# Patient Record
Sex: FEMALE | Race: WHITE | NOT HISPANIC OR LATINO | URBAN - NONMETROPOLITAN AREA
[De-identification: names, ages, dates, MRNs, and addresses within clinical notes are randomized per-mention and may not be internally consistent; named-entity substitution may affect disease eponyms.]

---

## 2021-08-25 ENCOUNTER — IMPORTED ENCOUNTER (OUTPATIENT)
Dept: URBAN - NONMETROPOLITAN AREA CLINIC 1 | Facility: CLINIC | Age: 76
End: 2021-08-25

## 2021-08-25 PROBLEM — H52.4: Noted: 2021-08-25

## 2021-08-25 PROBLEM — Z96.1: Noted: 2021-08-25

## 2021-08-25 PROBLEM — H26.493: Noted: 2021-08-25

## 2021-08-25 PROCEDURE — 99203 OFFICE O/P NEW LOW 30 MIN: CPT

## 2021-08-25 PROCEDURE — 92015 DETERMINE REFRACTIVE STATE: CPT

## 2021-08-25 NOTE — PATIENT DISCUSSION
Pseudophakia OU with PCO OU- Discussed diagnosis in detail with patient- Cataract surgery done by Dr. Anne-Marie Oliva at [facility] 2015- Patient has noticed decreased vision and trouble with glare - PCO OU noted but stable and no treatment needed at this time - Continue to monitorAstigmatism / Hyperopia / Presbyopia OU - Discussed diagnosis in detail with patient- New Glasses RX given today- Continue to monitor

## 2022-03-21 ENCOUNTER — EMERGENCY VISIT (OUTPATIENT)
Dept: URBAN - NONMETROPOLITAN AREA CLINIC 1 | Facility: CLINIC | Age: 77
End: 2022-03-21

## 2022-03-21 DIAGNOSIS — H43.811: ICD-10-CM

## 2022-03-21 PROCEDURE — 99213 OFFICE O/P EST LOW 20 MIN: CPT

## 2022-03-21 PROCEDURE — 92250 FUNDUS PHOTOGRAPHY W/I&R: CPT

## 2022-03-21 ASSESSMENT — TONOMETRY
OS_IOP_MMHG: 17
OD_IOP_MMHG: 16

## 2022-03-21 ASSESSMENT — VISUAL ACUITY
OS_CC: 20/25-2
OD_CC: 20/20

## 2022-04-10 ASSESSMENT — VISUAL ACUITY
OS_SC: 20/40-
OD_GLARE: 20/70
OS_PH: 20/30
OS_GLARE: 20/100
OD_SC: 20/20

## 2022-04-10 ASSESSMENT — TONOMETRY
OS_IOP_MMHG: 12
OD_IOP_MMHG: 12

## 2023-10-02 ENCOUNTER — COMPREHENSIVE EXAM (OUTPATIENT)
Dept: URBAN - NONMETROPOLITAN AREA CLINIC 1 | Facility: CLINIC | Age: 78
End: 2023-10-02

## 2023-10-02 DIAGNOSIS — H52.4: ICD-10-CM

## 2023-10-02 PROCEDURE — 92014 COMPRE OPH EXAM EST PT 1/>: CPT

## 2023-10-02 PROCEDURE — 92015 DETERMINE REFRACTIVE STATE: CPT

## 2023-10-02 ASSESSMENT — TONOMETRY
OS_IOP_MMHG: 16
OD_IOP_MMHG: 16

## 2023-10-02 ASSESSMENT — VISUAL ACUITY
OS_CC: 20/30+1
OU_CC: 20/25
OD_CC: 20/25

## 2024-10-03 ENCOUNTER — COMPREHENSIVE EXAM (OUTPATIENT)
Dept: URBAN - NONMETROPOLITAN AREA CLINIC 1 | Facility: CLINIC | Age: 79
End: 2024-10-03

## 2024-10-03 DIAGNOSIS — H52.4: ICD-10-CM

## 2024-10-03 PROCEDURE — 92015 DETERMINE REFRACTIVE STATE: CPT

## 2024-10-03 PROCEDURE — 92014 COMPRE OPH EXAM EST PT 1/>: CPT
